# Patient Record
Sex: FEMALE | Race: WHITE | NOT HISPANIC OR LATINO | Employment: OTHER | URBAN - METROPOLITAN AREA
[De-identification: names, ages, dates, MRNs, and addresses within clinical notes are randomized per-mention and may not be internally consistent; named-entity substitution may affect disease eponyms.]

---

## 2017-05-25 ENCOUNTER — IMPORTED ENCOUNTER (OUTPATIENT)
Dept: URBAN - METROPOLITAN AREA CLINIC 43 | Facility: CLINIC | Age: 82
End: 2017-05-25

## 2017-05-25 PROBLEM — H10.45: Noted: 2017-05-25

## 2017-05-25 PROBLEM — L82.1: Noted: 2017-05-25

## 2017-05-25 PROBLEM — H35.373: Noted: 2017-05-25

## 2017-05-25 PROBLEM — H02.824: Noted: 2017-05-25

## 2017-06-22 ENCOUNTER — IMPORTED ENCOUNTER (OUTPATIENT)
Dept: URBAN - METROPOLITAN AREA CLINIC 43 | Facility: CLINIC | Age: 82
End: 2017-06-22

## 2017-06-22 PROBLEM — L82.1: Noted: 2017-06-22

## 2017-06-22 PROBLEM — H02.824: Noted: 2017-06-22

## 2017-06-23 ENCOUNTER — IMPORTED ENCOUNTER (OUTPATIENT)
Dept: URBAN - METROPOLITAN AREA CLINIC 43 | Facility: CLINIC | Age: 82
End: 2017-06-23

## 2017-06-27 ENCOUNTER — IMPORTED ENCOUNTER (OUTPATIENT)
Dept: URBAN - METROPOLITAN AREA CLINIC 43 | Facility: CLINIC | Age: 82
End: 2017-06-27

## 2017-06-29 ENCOUNTER — IMPORTED ENCOUNTER (OUTPATIENT)
Dept: URBAN - METROPOLITAN AREA CLINIC 43 | Facility: CLINIC | Age: 82
End: 2017-06-29

## 2017-07-10 ENCOUNTER — IMPORTED ENCOUNTER (OUTPATIENT)
Dept: URBAN - METROPOLITAN AREA CLINIC 43 | Facility: CLINIC | Age: 82
End: 2017-07-10

## 2017-07-10 PROBLEM — L82.1: Noted: 2017-07-10

## 2017-07-10 PROBLEM — H02.824: Noted: 2017-07-10

## 2018-03-28 ENCOUNTER — IMPORTED ENCOUNTER (OUTPATIENT)
Dept: URBAN - METROPOLITAN AREA CLINIC 43 | Facility: CLINIC | Age: 83
End: 2018-03-28

## 2018-03-28 PROBLEM — H02.132: Noted: 2018-03-28

## 2018-03-28 PROBLEM — H35.3131: Noted: 2018-03-28

## 2018-03-28 PROBLEM — H02.135: Noted: 2018-03-28

## 2018-07-03 ENCOUNTER — IMPORTED ENCOUNTER (OUTPATIENT)
Dept: URBAN - METROPOLITAN AREA CLINIC 43 | Facility: CLINIC | Age: 83
End: 2018-07-03

## 2018-07-03 PROBLEM — H53.8: Noted: 2018-07-03

## 2018-07-03 PROBLEM — H16.223: Noted: 2018-07-03

## 2019-01-28 ENCOUNTER — IMPORTED ENCOUNTER (OUTPATIENT)
Dept: URBAN - METROPOLITAN AREA CLINIC 43 | Facility: CLINIC | Age: 84
End: 2019-01-28

## 2019-01-28 PROBLEM — L03.211: Noted: 2019-01-28

## 2019-02-01 ENCOUNTER — IMPORTED ENCOUNTER (OUTPATIENT)
Dept: URBAN - METROPOLITAN AREA CLINIC 43 | Facility: CLINIC | Age: 84
End: 2019-02-01

## 2019-02-04 ENCOUNTER — IMPORTED ENCOUNTER (OUTPATIENT)
Dept: URBAN - METROPOLITAN AREA CLINIC 43 | Facility: CLINIC | Age: 84
End: 2019-02-04

## 2019-02-04 PROBLEM — L03.211: Noted: 2019-02-04

## 2019-02-13 ENCOUNTER — IMPORTED ENCOUNTER (OUTPATIENT)
Dept: URBAN - METROPOLITAN AREA CLINIC 43 | Facility: CLINIC | Age: 84
End: 2019-02-13

## 2019-03-21 ENCOUNTER — IMPORTED ENCOUNTER (OUTPATIENT)
Dept: URBAN - METROPOLITAN AREA CLINIC 43 | Facility: CLINIC | Age: 84
End: 2019-03-21

## 2019-03-21 PROBLEM — L03.211: Noted: 2019-03-21

## 2019-03-21 PROBLEM — H16.223: Noted: 2019-03-21

## 2019-03-21 PROBLEM — H10.45: Noted: 2019-03-21

## 2019-03-21 PROBLEM — L91.0: Noted: 2019-03-21

## 2019-04-01 ENCOUNTER — IMPORTED ENCOUNTER (OUTPATIENT)
Dept: URBAN - METROPOLITAN AREA CLINIC 43 | Facility: CLINIC | Age: 84
End: 2019-04-01

## 2019-04-01 PROBLEM — H10.45: Noted: 2019-04-01

## 2019-06-04 ENCOUNTER — IMPORTED ENCOUNTER (OUTPATIENT)
Dept: URBAN - METROPOLITAN AREA CLINIC 43 | Facility: CLINIC | Age: 84
End: 2019-06-04

## 2019-06-04 PROBLEM — H35.373: Noted: 2019-06-04

## 2019-06-04 PROBLEM — H35.3131: Noted: 2019-06-04

## 2019-09-04 ENCOUNTER — IMPORTED ENCOUNTER (OUTPATIENT)
Dept: URBAN - METROPOLITAN AREA CLINIC 43 | Facility: CLINIC | Age: 84
End: 2019-09-04

## 2019-10-16 ENCOUNTER — EST. PATIENT EMERGENCY (OUTPATIENT)
Dept: URBAN - METROPOLITAN AREA CLINIC 32 | Facility: CLINIC | Age: 84
End: 2019-10-16

## 2019-10-16 DIAGNOSIS — H16.223: ICD-10-CM

## 2019-10-16 DIAGNOSIS — H52.223: ICD-10-CM

## 2019-10-16 DIAGNOSIS — H35.3131: ICD-10-CM

## 2019-10-16 PROCEDURE — 2019F DILATED MACUL EXAM DONE: CPT

## 2019-10-16 PROCEDURE — 92014 COMPRE OPH EXAM EST PT 1/>: CPT

## 2019-10-16 PROCEDURE — G8428 CUR MEDS NOT DOCUMENT: HCPCS

## 2019-10-16 PROCEDURE — G9974 MAC EXAM PERF: HCPCS

## 2019-10-16 PROCEDURE — G9903 PT SCRN TBCO ID AS NON USER: HCPCS

## 2019-10-16 PROCEDURE — 4177F TALK PT/CRGVR RE AREDS PREV: CPT

## 2019-10-16 PROCEDURE — G8756 NO BP MEASURE DOC: HCPCS

## 2019-10-16 PROCEDURE — 4040F PNEUMOC VAC/ADMIN/RCVD: CPT

## 2019-10-16 PROCEDURE — 4007F ARMD: AREDS RECOMMENDED: CPT

## 2019-10-16 PROCEDURE — G8483 FLU IMM NO ADMIN DOC REA: HCPCS

## 2019-10-16 PROCEDURE — 1036F TOBACCO NON-USER: CPT

## 2019-10-16 PROCEDURE — G9744 PT NOT ELI D/T ACT DIG HTN: HCPCS

## 2019-10-16 PROCEDURE — 92015 DETERMINE REFRACTIVE STATE: CPT

## 2019-10-16 ASSESSMENT — TONOMETRY
OS_IOP_MMHG: 12
OD_IOP_MMHG: 10

## 2019-10-16 ASSESSMENT — VISUAL ACUITY
OS_SC: 20/100-1
OD_SC: 20/50-2
OS_BAT: 20/400
OD_BAT: 20/400

## 2020-03-01 ASSESSMENT — VISUAL ACUITY
OS_PH: 20/40
OS_SC: 20/70
OS_CC: 20/20 -2
OS_CC: J1-1
OD_CC: 20/20
OD_CC: J1
OS_SC: 20/100-1
OS_CC: 20/20
OD_CC: 20/30
OD_SC: 20/60
OS_CC: 20/20
OS_CC: 20/30
OS_CC: 20/30
OS_SC: 20/100-1
OS_CC: J2
OD_CC: 20/30
OD_SC: 20/70
OS_CC: 20/30-2
OD_CC: J1
OD_PH: 20/40
OD_CC: 20/20
OD_CC: 20/20
OD_CC: 20/25
OS_CC: 20/25
OS_CC: 20/25
OS_SC: 20/100-1
OD_OTHER: <20/400.
OD_CC: J1+-2
OS_CC: J1+
OS_CC: 20/20
OD_SC: 20/70
OS_CC: J1+
OD_SC: 20/70-1
OS_OTHER: <20/400.
OD_CC: 20/20 -1
OD_CC: J1+

## 2020-03-01 ASSESSMENT — TONOMETRY
OS_IOP_MMHG: 11.0
OS_IOP_MMHG: 9.0
OS_IOP_MMHG: 11.0
OS_IOP_MMHG: 10.0
OD_IOP_MMHG: 9.0
OD_IOP_MMHG: 10.0
OD_IOP_MMHG: 9.0
OS_IOP_MMHG: 11.0
OD_IOP_MMHG: 10.0
OS_IOP_MMHG: 12.0
OS_IOP_MMHG: 14.0
OD_IOP_MMHG: 10.0

## 2020-03-01 ASSESSMENT — KERATOMETRY
OD_AXISANGLE_DEGREES: 89
OD_K2POWER_DIOPTERS: 42.5
OS_K1POWER_DIOPTERS: 45.5
OD_AXISANGLE_DEGREES: 90
OD_K1POWER_DIOPTERS: 45.25
OS_K2POWER_DIOPTERS: 43.75
OS_AXISANGLE_DEGREES: 80
OS_AXISANGLE2_DEGREES: 170
OS_K1POWER_DIOPTERS: 45.5
OD_K2POWER_DIOPTERS: 43.75
OS_AXISANGLE2_DEGREES: 175
OS_K1POWER_DIOPTERS: 45.75
OD_AXISANGLE2_DEGREES: 179
OS_K2POWER_DIOPTERS: 43.5
OD_K2POWER_DIOPTERS: 43.75
OS_AXISANGLE_DEGREES: 85
OS_AXISANGLE_DEGREES: 80
OS_K2POWER_DIOPTERS: 43.25
OD_AXISANGLE2_DEGREES: 180
OD_AXISANGLE_DEGREES: 90
OS_AXISANGLE2_DEGREES: 170
OD_AXISANGLE2_DEGREES: 180
OD_K1POWER_DIOPTERS: 45.25
OD_K1POWER_DIOPTERS: 45.75

## 2020-09-28 ENCOUNTER — ESTABLISHED COMPREHENSIVE EXAM (OUTPATIENT)
Dept: URBAN - METROPOLITAN AREA CLINIC 32 | Facility: CLINIC | Age: 85
End: 2020-09-28

## 2020-09-28 DIAGNOSIS — H52.223: ICD-10-CM

## 2020-09-28 DIAGNOSIS — H16.223: ICD-10-CM

## 2020-09-28 DIAGNOSIS — H35.3131: ICD-10-CM

## 2020-09-28 DIAGNOSIS — H35.373: ICD-10-CM

## 2020-09-28 PROCEDURE — 92015 DETERMINE REFRACTIVE STATE: CPT

## 2020-09-28 PROCEDURE — 92014 COMPRE OPH EXAM EST PT 1/>: CPT

## 2020-09-28 PROCEDURE — 92250 FUNDUS PHOTOGRAPHY W/I&R: CPT

## 2020-09-28 ASSESSMENT — VISUAL ACUITY
OS_CC: J2
OS_CC: 20/60
OD_CC: 20/50
OD_CC: J1

## 2020-09-28 ASSESSMENT — TONOMETRY
OS_IOP_MMHG: 13
OD_IOP_MMHG: 13

## 2021-03-29 ENCOUNTER — ESTABLISHED COMPREHENSIVE EXAM (OUTPATIENT)
Dept: URBAN - METROPOLITAN AREA CLINIC 32 | Facility: CLINIC | Age: 86
End: 2021-03-29

## 2021-03-29 DIAGNOSIS — H16.223: ICD-10-CM

## 2021-03-29 DIAGNOSIS — H35.373: ICD-10-CM

## 2021-03-29 DIAGNOSIS — H35.3131: ICD-10-CM

## 2021-03-29 PROCEDURE — 92014 COMPRE OPH EXAM EST PT 1/>: CPT

## 2021-03-29 PROCEDURE — 92134 CPTRZ OPH DX IMG PST SGM RTA: CPT

## 2021-03-29 ASSESSMENT — KERATOMETRY
OS_AXISANGLE2_DEGREES: 80
OS_K2POWER_DIOPTERS: 43.75
OD_K1POWER_DIOPTERS: 46.50
OS_K1POWER_DIOPTERS: 46.00
OD_AXISANGLE_DEGREES: 1
OD_AXISANGLE2_DEGREES: 91
OD_K2POWER_DIOPTERS: 44.00
OS_AXISANGLE_DEGREES: 170

## 2021-03-29 ASSESSMENT — VISUAL ACUITY
OS_CC: 20/25+2
OD_CC: J2
OS_CC: J2
OD_CC: 20/30

## 2021-03-29 ASSESSMENT — TONOMETRY
OD_IOP_MMHG: 14
OS_IOP_MMHG: 16

## 2021-04-08 NOTE — PATIENT DISCUSSION
Cataract surgery has been performed in the first eye and activities of daily living are still impaired. The patient would like to proceed with cataract surgery in the second eye as scheduled. The patient elects Advanced TMF +3.25 OS, goal of [place].

## 2021-09-10 ENCOUNTER — NEW REFERRAL (OUTPATIENT)
Dept: URBAN - METROPOLITAN AREA CLINIC 33 | Facility: CLINIC | Age: 86
End: 2021-09-10

## 2021-09-10 VITALS
SYSTOLIC BLOOD PRESSURE: 122 MMHG | BODY MASS INDEX: 28.32 KG/M2 | WEIGHT: 150 LBS | HEIGHT: 61 IN | DIASTOLIC BLOOD PRESSURE: 73 MMHG | HEART RATE: 67 BPM

## 2021-09-10 DIAGNOSIS — H35.3131: ICD-10-CM

## 2021-09-10 DIAGNOSIS — H43.812: ICD-10-CM

## 2021-09-10 DIAGNOSIS — H04.123: ICD-10-CM

## 2021-09-10 DIAGNOSIS — H35.373: ICD-10-CM

## 2021-09-10 PROCEDURE — 99204 OFFICE O/P NEW MOD 45 MIN: CPT

## 2021-09-10 PROCEDURE — 92250 FUNDUS PHOTOGRAPHY W/I&R: CPT

## 2021-09-10 PROCEDURE — 92134 CPTRZ OPH DX IMG PST SGM RTA: CPT

## 2021-09-10 ASSESSMENT — TONOMETRY
OS_IOP_MMHG: 10
OD_IOP_MMHG: 12

## 2021-09-10 ASSESSMENT — VISUAL ACUITY
OS_PH: 20/25
OS_SC: 20/70
OD_SC: 20/40+2
OD_PH: 20/25+2

## 2021-11-01 ENCOUNTER — EST. PATIENT EMERGENCY (OUTPATIENT)
Dept: URBAN - METROPOLITAN AREA CLINIC 32 | Facility: CLINIC | Age: 86
End: 2021-11-01

## 2021-11-01 DIAGNOSIS — H35.3131: ICD-10-CM

## 2021-11-01 DIAGNOSIS — H16.223: ICD-10-CM

## 2021-11-01 DIAGNOSIS — G43.119: ICD-10-CM

## 2021-11-01 PROCEDURE — 92014 COMPRE OPH EXAM EST PT 1/>: CPT

## 2021-11-01 ASSESSMENT — KERATOMETRY
OS_AXISANGLE2_DEGREES: 80
OS_K1POWER_DIOPTERS: 46.00
OD_K1POWER_DIOPTERS: 46.50
OD_AXISANGLE2_DEGREES: 91
OS_AXISANGLE_DEGREES: 170
OS_K2POWER_DIOPTERS: 43.75
OD_K2POWER_DIOPTERS: 44.00
OD_AXISANGLE_DEGREES: 1

## 2021-11-01 ASSESSMENT — VISUAL ACUITY
OS_SC: 20/80
OS_SC: J10
OD_CC: 20/40
OD_SC: 20/50
OD_CC: J5
OS_PH: 20/40
OD_PH: 20/40-1
OS_CC: 20/40
OS_CC: J10
OD_SC: J5

## 2021-11-01 ASSESSMENT — TONOMETRY
OS_IOP_MMHG: 12
OD_IOP_MMHG: 12

## 2021-11-23 ENCOUNTER — FOLLOW UP (OUTPATIENT)
Dept: URBAN - METROPOLITAN AREA CLINIC 32 | Facility: CLINIC | Age: 86
End: 2021-11-23

## 2021-11-23 DIAGNOSIS — H35.3131: ICD-10-CM

## 2021-11-23 DIAGNOSIS — G43.119: ICD-10-CM

## 2021-11-23 DIAGNOSIS — H16.223: ICD-10-CM

## 2021-11-23 DIAGNOSIS — H52.203: ICD-10-CM

## 2021-11-23 DIAGNOSIS — H02.102: ICD-10-CM

## 2021-11-23 DIAGNOSIS — H02.105: ICD-10-CM

## 2021-11-23 PROCEDURE — 92015 DETERMINE REFRACTIVE STATE: CPT

## 2021-11-23 PROCEDURE — 92012 INTRM OPH EXAM EST PATIENT: CPT

## 2021-11-23 ASSESSMENT — VISUAL ACUITY
OS_SC: 20/80-1
OS_CC: J3-2
OU_CC: 20/30-1
OD_CC: 20/40-1
OD_SC: 20/70
OS_CC: 20/60-1
OU_CC: J2
OU_SC: 20/70
OD_CC: J2

## 2021-11-23 ASSESSMENT — KERATOMETRY
OD_K1POWER_DIOPTERS: 46.50
OD_K2POWER_DIOPTERS: 44.00
OS_K2POWER_DIOPTERS: 43.75
OS_AXISANGLE_DEGREES: 170
OD_AXISANGLE2_DEGREES: 91
OS_K1POWER_DIOPTERS: 46.00
OD_AXISANGLE_DEGREES: 1
OS_AXISANGLE2_DEGREES: 80

## 2022-03-31 ENCOUNTER — FOLLOW UP (OUTPATIENT)
Dept: URBAN - METROPOLITAN AREA CLINIC 32 | Facility: CLINIC | Age: 87
End: 2022-03-31

## 2022-03-31 DIAGNOSIS — H35.373: ICD-10-CM

## 2022-03-31 DIAGNOSIS — H16.223: ICD-10-CM

## 2022-03-31 DIAGNOSIS — H35.3131: ICD-10-CM

## 2022-03-31 PROCEDURE — 92250 FUNDUS PHOTOGRAPHY W/I&R: CPT

## 2022-03-31 PROCEDURE — 92015 DETERMINE REFRACTIVE STATE: CPT

## 2022-03-31 PROCEDURE — 99214 OFFICE O/P EST MOD 30 MIN: CPT

## 2022-03-31 ASSESSMENT — KERATOMETRY
OD_AXISANGLE2_DEGREES: 91
OD_AXISANGLE_DEGREES: 1
OS_AXISANGLE_DEGREES: 170
OS_AXISANGLE2_DEGREES: 80
OS_K2POWER_DIOPTERS: 43.75
OD_K2POWER_DIOPTERS: 44.00
OS_K1POWER_DIOPTERS: 46.00
OD_K1POWER_DIOPTERS: 46.50

## 2022-03-31 ASSESSMENT — TONOMETRY
OD_IOP_MMHG: 10
OS_IOP_MMHG: 10

## 2022-03-31 ASSESSMENT — VISUAL ACUITY
OD_CC: 20/25-1
OD_CC: J1+
OS_CC: J1+ (-1)
OS_CC: 20/30+1

## 2022-08-11 ENCOUNTER — FOLLOW UP (OUTPATIENT)
Dept: URBAN - METROPOLITAN AREA CLINIC 33 | Facility: CLINIC | Age: 87
End: 2022-08-11

## 2022-08-11 VITALS — WEIGHT: 150 LBS | HEIGHT: 60 IN | BODY MASS INDEX: 29.45 KG/M2

## 2022-08-11 DIAGNOSIS — H35.373: ICD-10-CM

## 2022-08-11 DIAGNOSIS — H43.812: ICD-10-CM

## 2022-08-11 DIAGNOSIS — H35.3131: ICD-10-CM

## 2022-08-11 DIAGNOSIS — H04.123: ICD-10-CM

## 2022-08-11 PROCEDURE — 92134 CPTRZ OPH DX IMG PST SGM RTA: CPT

## 2022-08-11 PROCEDURE — 92014 COMPRE OPH EXAM EST PT 1/>: CPT

## 2022-08-11 PROCEDURE — 92250 FUNDUS PHOTOGRAPHY W/I&R: CPT

## 2022-08-11 ASSESSMENT — VISUAL ACUITY
OD_SC: 20/40
OS_PH: 20/50
OS_SC: 20/80

## 2022-08-11 ASSESSMENT — TONOMETRY
OD_IOP_MMHG: 13
OS_IOP_MMHG: 12

## 2022-11-28 ENCOUNTER — EMERGENCY VISIT (OUTPATIENT)
Dept: URBAN - METROPOLITAN AREA CLINIC 32 | Facility: CLINIC | Age: 87
End: 2022-11-28

## 2022-11-28 DIAGNOSIS — H16.223: ICD-10-CM

## 2022-11-28 DIAGNOSIS — H35.3131: ICD-10-CM

## 2022-11-28 DIAGNOSIS — H35.373: ICD-10-CM

## 2022-11-28 PROCEDURE — A4262 TEMPORARY TEAR DUCT PLUG: HCPCS

## 2022-11-28 PROCEDURE — 68761Q PUNCTAL PLUG/QUINTESS DISSOLVABLE PLUG/EACH

## 2022-11-28 PROCEDURE — 92134 CPTRZ OPH DX IMG PST SGM RTA: CPT

## 2022-11-28 PROCEDURE — 99213 OFFICE O/P EST LOW 20 MIN: CPT

## 2022-11-28 ASSESSMENT — KERATOMETRY
OD_K2POWER_DIOPTERS: 44.00
OD_AXISANGLE2_DEGREES: 91
OS_K2POWER_DIOPTERS: 43.75
OS_K1POWER_DIOPTERS: 46.00
OD_AXISANGLE_DEGREES: 1
OS_AXISANGLE_DEGREES: 170
OS_AXISANGLE2_DEGREES: 80
OD_K1POWER_DIOPTERS: 46.50

## 2022-11-28 ASSESSMENT — TONOMETRY
OD_IOP_MMHG: 11
OS_IOP_MMHG: 13

## 2022-11-28 ASSESSMENT — VISUAL ACUITY
OS_SC: 20/80+2
OD_CC: 20/25-1
OD_SC: 20/40
OS_CC: 20/30

## 2023-04-13 ASSESSMENT — KERATOMETRY
OD_K2POWER_DIOPTERS: 44.00
OS_K1POWER_DIOPTERS: 46.00
OD_K1POWER_DIOPTERS: 46.50
OS_AXISANGLE_DEGREES: 170
OS_K2POWER_DIOPTERS: 43.75
OS_AXISANGLE2_DEGREES: 80
OD_AXISANGLE_DEGREES: 1
OD_AXISANGLE2_DEGREES: 91

## 2023-04-14 ENCOUNTER — COMPREHENSIVE EXAM (OUTPATIENT)
Dept: URBAN - METROPOLITAN AREA CLINIC 32 | Facility: CLINIC | Age: 88
End: 2023-04-14

## 2023-04-14 DIAGNOSIS — Z96.1: ICD-10-CM

## 2023-04-14 DIAGNOSIS — H10.13: ICD-10-CM

## 2023-04-14 DIAGNOSIS — H52.203: ICD-10-CM

## 2023-04-14 DIAGNOSIS — H04.123: ICD-10-CM

## 2023-04-14 DIAGNOSIS — H02.202: ICD-10-CM

## 2023-04-14 DIAGNOSIS — H35.3131: ICD-10-CM

## 2023-04-14 DIAGNOSIS — H35.373: ICD-10-CM

## 2023-04-14 DIAGNOSIS — H02.205: ICD-10-CM

## 2023-04-14 PROCEDURE — 92015 DETERMINE REFRACTIVE STATE: CPT

## 2023-04-14 PROCEDURE — 92014 COMPRE OPH EXAM EST PT 1/>: CPT

## 2023-04-14 ASSESSMENT — VISUAL ACUITY
OD_CC: J3
OS_CC: 20/30
OS_CC: J5
OD_CC: 20/25
OU_CC: J3
OU_CC: 20/25

## 2023-04-14 ASSESSMENT — TONOMETRY
OS_IOP_MMHG: 6
OD_IOP_MMHG: 8

## 2023-11-22 ENCOUNTER — COMPREHENSIVE EXAM (OUTPATIENT)
Dept: URBAN - METROPOLITAN AREA CLINIC 32 | Facility: CLINIC | Age: 88
End: 2023-11-22

## 2023-11-22 DIAGNOSIS — H35.3131: ICD-10-CM

## 2023-11-22 DIAGNOSIS — H35.373: ICD-10-CM

## 2023-11-22 DIAGNOSIS — H04.123: ICD-10-CM

## 2023-11-22 DIAGNOSIS — H10.13: ICD-10-CM

## 2023-11-22 PROCEDURE — 92015 DETERMINE REFRACTIVE STATE: CPT

## 2023-11-22 PROCEDURE — 99214 OFFICE O/P EST MOD 30 MIN: CPT

## 2023-11-22 ASSESSMENT — KERATOMETRY
OS_AXISANGLE_DEGREES: 170
OS_K2POWER_DIOPTERS: 43.75
OS_K1POWER_DIOPTERS: 46.00
OD_AXISANGLE_DEGREES: 1
OD_K2POWER_DIOPTERS: 44.00
OD_K1POWER_DIOPTERS: 46.50
OS_AXISANGLE2_DEGREES: 80
OD_AXISANGLE2_DEGREES: 91

## 2023-11-22 ASSESSMENT — VISUAL ACUITY
OS_CC: 20/40+2
OS_CC: J2
OD_CC: 20/40
OD_CC: J1

## 2023-11-22 ASSESSMENT — TONOMETRY
OD_IOP_MMHG: 09
OS_IOP_MMHG: 12

## 2024-04-16 ENCOUNTER — COMPREHENSIVE EXAM (OUTPATIENT)
Dept: URBAN - METROPOLITAN AREA CLINIC 32 | Facility: CLINIC | Age: 89
End: 2024-04-16

## 2024-04-16 DIAGNOSIS — H52.203: ICD-10-CM

## 2024-04-16 DIAGNOSIS — H10.45: ICD-10-CM

## 2024-04-16 DIAGNOSIS — H35.373: ICD-10-CM

## 2024-04-16 DIAGNOSIS — H04.123: ICD-10-CM

## 2024-04-16 DIAGNOSIS — H35.3131: ICD-10-CM

## 2024-04-16 DIAGNOSIS — H02.135: ICD-10-CM

## 2024-04-16 DIAGNOSIS — H02.132: ICD-10-CM

## 2024-04-16 DIAGNOSIS — H02.403: ICD-10-CM

## 2024-04-16 PROCEDURE — 92015 DETERMINE REFRACTIVE STATE: CPT

## 2024-04-16 PROCEDURE — 99214 OFFICE O/P EST MOD 30 MIN: CPT

## 2024-04-16 PROCEDURE — 92250 FUNDUS PHOTOGRAPHY W/I&R: CPT

## 2024-04-16 ASSESSMENT — KERATOMETRY
OD_K1POWER_DIOPTERS: 46.50
OS_K2POWER_DIOPTERS: 43.75
OS_K1POWER_DIOPTERS: 46.00
OS_AXISANGLE_DEGREES: 170
OD_K2POWER_DIOPTERS: 44.00
OD_AXISANGLE2_DEGREES: 91
OD_AXISANGLE_DEGREES: 1
OS_AXISANGLE2_DEGREES: 80

## 2024-04-16 ASSESSMENT — TONOMETRY
OS_IOP_MMHG: 12
OD_IOP_MMHG: 13

## 2024-04-16 ASSESSMENT — VISUAL ACUITY
OD_CC: J1
OS_CC: 20/40
OS_CC: J2
OD_CC: 20/30

## 2024-10-02 ENCOUNTER — EMERGENCY VISIT (OUTPATIENT)
Dept: URBAN - METROPOLITAN AREA CLINIC 32 | Facility: CLINIC | Age: 89
End: 2024-10-02

## 2024-10-02 DIAGNOSIS — H02.135: ICD-10-CM

## 2024-10-02 DIAGNOSIS — R51.9: ICD-10-CM

## 2024-10-02 DIAGNOSIS — H04.123: ICD-10-CM

## 2024-10-02 DIAGNOSIS — H02.132: ICD-10-CM

## 2024-10-02 PROCEDURE — 99213 OFFICE O/P EST LOW 20 MIN: CPT

## 2025-05-12 ENCOUNTER — COMPREHENSIVE EXAM (OUTPATIENT)
Age: 89
End: 2025-05-12

## 2025-05-12 DIAGNOSIS — H35.373: ICD-10-CM

## 2025-05-12 DIAGNOSIS — H04.123: ICD-10-CM

## 2025-05-12 DIAGNOSIS — H10.45: ICD-10-CM

## 2025-05-12 DIAGNOSIS — H35.3131: ICD-10-CM

## 2025-05-12 PROCEDURE — 92015 DETERMINE REFRACTIVE STATE: CPT

## 2025-05-12 PROCEDURE — 92134 CPTRZ OPH DX IMG PST SGM RTA: CPT

## 2025-05-12 PROCEDURE — 99214 OFFICE O/P EST MOD 30 MIN: CPT
